# Patient Record
Sex: MALE | Race: ASIAN | NOT HISPANIC OR LATINO | URBAN - METROPOLITAN AREA
[De-identification: names, ages, dates, MRNs, and addresses within clinical notes are randomized per-mention and may not be internally consistent; named-entity substitution may affect disease eponyms.]

---

## 2019-06-20 ENCOUNTER — EMERGENCY (EMERGENCY)
Facility: HOSPITAL | Age: 29
LOS: 1 days | Discharge: HOME | End: 2019-06-20
Attending: EMERGENCY MEDICINE | Admitting: EMERGENCY MEDICINE
Payer: COMMERCIAL

## 2019-06-20 VITALS
RESPIRATION RATE: 16 BRPM | OXYGEN SATURATION: 100 % | DIASTOLIC BLOOD PRESSURE: 83 MMHG | HEART RATE: 74 BPM | SYSTOLIC BLOOD PRESSURE: 123 MMHG | TEMPERATURE: 98 F

## 2019-06-20 LAB
APPEARANCE UR: CLEAR — SIGNIFICANT CHANGE UP
BACTERIA # UR AUTO: NEGATIVE — SIGNIFICANT CHANGE UP
BILIRUB UR-MCNC: NEGATIVE — SIGNIFICANT CHANGE UP
BLOOD UR QL VISUAL: HIGH
COLOR SPEC: YELLOW — SIGNIFICANT CHANGE UP
GLUCOSE UR-MCNC: NEGATIVE — SIGNIFICANT CHANGE UP
HYALINE CASTS # UR AUTO: NEGATIVE — SIGNIFICANT CHANGE UP
KETONES UR-MCNC: NEGATIVE — SIGNIFICANT CHANGE UP
LEUKOCYTE ESTERASE UR-ACNC: NEGATIVE — SIGNIFICANT CHANGE UP
NITRITE UR-MCNC: NEGATIVE — SIGNIFICANT CHANGE UP
PH UR: 6.5 — SIGNIFICANT CHANGE UP (ref 5–8)
PROT UR-MCNC: 30 — SIGNIFICANT CHANGE UP
RBC CASTS # UR COMP ASSIST: >50 — HIGH (ref 0–?)
SP GR SPEC: 1.02 — SIGNIFICANT CHANGE UP (ref 1–1.04)
SQUAMOUS # UR AUTO: SIGNIFICANT CHANGE UP
UROBILINOGEN FLD QL: NORMAL — SIGNIFICANT CHANGE UP
WBC UR QL: SIGNIFICANT CHANGE UP (ref 0–?)

## 2019-06-20 PROCEDURE — 76770 US EXAM ABDO BACK WALL COMP: CPT | Mod: 26

## 2019-06-20 PROCEDURE — 99284 EMERGENCY DEPT VISIT MOD MDM: CPT

## 2019-06-20 RX ORDER — KETOROLAC TROMETHAMINE 30 MG/ML
15 SYRINGE (ML) INJECTION ONCE
Refills: 0 | Status: DISCONTINUED | OUTPATIENT
Start: 2019-06-20 | End: 2019-06-20

## 2019-06-20 RX ORDER — SODIUM CHLORIDE 9 MG/ML
1000 INJECTION INTRAMUSCULAR; INTRAVENOUS; SUBCUTANEOUS ONCE
Refills: 0 | Status: DISCONTINUED | OUTPATIENT
Start: 2019-06-20 | End: 2019-06-20

## 2019-06-20 NOTE — ED ADULT TRIAGE NOTE - CHIEF COMPLAINT QUOTE
Pt c/o right flank pain/nausea x 2 hours, denies urinary symptoms. States was lifting 50 pound weight at gym prior to pain starting.

## 2019-06-20 NOTE — ED PROVIDER NOTE - PROGRESS NOTE DETAILS
TAVIA Cruz - spoke with radiology, on us mild pevic fullness, no hydro, pt still asymptomatic, will dc.

## 2019-06-20 NOTE — ED PROVIDER NOTE - NSFOLLOWUPINSTRUCTIONS_ED_ALL_ED_FT
PLEASE MAKE SURE THAT YOU FOLLOW UP WITH YOUR DOCTOR, ALSO FOLLOW UP WITH UROLOGIST. DRINK PLENTY OF WATER. RETURN TO ER FOR INCREASED PAIN, FEVER, NAUSEA, VOMITING OR ANY CONCERNS.

## 2019-06-20 NOTE — ED PROVIDER NOTE - ATTENDING CONTRIBUTION TO CARE
29M with right flank pain. Started today, sharp, intermittent. No dysuria or hematuria. On exam well appearing, nad, mmm, rrr, lungs clear, abd soft NT/ND, no CVA tenderness, 2+ pulses, no edema, no rash, alert, speech clear. Concern for kidney stone, UA + RBCs, plan for ultrasound.

## 2019-06-20 NOTE — ED PROVIDER NOTE - OBJECTIVE STATEMENT
Pt is 28 y/o male with no significant PMhx here for eval of Rt flank pain. Pt states she experienced sudden onset of Rt lower back pain with radiation to Rt flank that was sharp and colicky, pt felt nauseous when the sx started, all sx lasted about 10 minutes and resolved spontaneously. Pt Pt is 30 y/o male with no significant PMhx here for eval of Rt flank pain. Pt states she experienced sudden onset of Rt lower back pain with radiation to Rt flank that was sharp and colicky, pt felt nauseous when the sx started, all sx lasted about 10 minutes and resolved spontaneously. Pt has been asymptomatic since. There is no fever, chills, Pt is 28 y/o male with no significant PMhx here for eval of Rt flank pain. Pt states she experienced sudden onset of Rt lower back pain with radiation to Rt flank that was sharp and colicky, pt felt nauseous when the sx started, all sx lasted about 10 minutes and resolved spontaneously. Pt has been pain free since. Had urgency when the pain started which has resolved. Denies fever, chills, hematuria, dysuria, testicular pain, denies falls, pt denies ext weakness or numbness, denies saddle anesthesia, urinary retention, denies fever, chills, urinary sx, chronic steroids use, IVDU, denies hx of malignancy, denies hx of spinal surgeries or epidural injections; no hx of kidney stones; (-) cp,sob, palpitations;

## 2019-06-22 LAB
BACTERIA UR CULT: SIGNIFICANT CHANGE UP
SPECIMEN SOURCE: SIGNIFICANT CHANGE UP
